# Patient Record
Sex: MALE | Race: WHITE | NOT HISPANIC OR LATINO
[De-identification: names, ages, dates, MRNs, and addresses within clinical notes are randomized per-mention and may not be internally consistent; named-entity substitution may affect disease eponyms.]

---

## 2020-10-29 PROBLEM — Z00.00 ENCOUNTER FOR PREVENTIVE HEALTH EXAMINATION: Status: ACTIVE | Noted: 2020-10-29

## 2020-12-31 ENCOUNTER — APPOINTMENT (OUTPATIENT)
Dept: NEUROLOGY | Facility: CLINIC | Age: 45
End: 2020-12-31
Payer: COMMERCIAL

## 2020-12-31 VITALS
OXYGEN SATURATION: 98 % | DIASTOLIC BLOOD PRESSURE: 73 MMHG | RESPIRATION RATE: 16 BRPM | BODY MASS INDEX: 32.58 KG/M2 | WEIGHT: 215 LBS | HEIGHT: 68 IN | TEMPERATURE: 97.3 F | HEART RATE: 65 BPM | SYSTOLIC BLOOD PRESSURE: 107 MMHG

## 2020-12-31 DIAGNOSIS — G43.009 MIGRAINE W/OUT AURA, NOT INTRACTABLE, W/OUT STATUS MIGRAINOSUS: ICD-10-CM

## 2020-12-31 DIAGNOSIS — Z78.9 OTHER SPECIFIED HEALTH STATUS: ICD-10-CM

## 2020-12-31 DIAGNOSIS — Z87.891 PERSONAL HISTORY OF NICOTINE DEPENDENCE: ICD-10-CM

## 2020-12-31 DIAGNOSIS — R73.9 HYPERGLYCEMIA, UNSPECIFIED: ICD-10-CM

## 2020-12-31 DIAGNOSIS — R41.3 OTHER AMNESIA: ICD-10-CM

## 2020-12-31 DIAGNOSIS — Z82.49 FAMILY HISTORY OF ISCHEMIC HEART DISEASE AND OTHER DISEASES OF THE CIRCULATORY SYSTEM: ICD-10-CM

## 2020-12-31 DIAGNOSIS — E78.5 HYPERLIPIDEMIA, UNSPECIFIED: ICD-10-CM

## 2020-12-31 PROCEDURE — 99203 OFFICE O/P NEW LOW 30 MIN: CPT

## 2020-12-31 PROCEDURE — 99072 ADDL SUPL MATRL&STAF TM PHE: CPT

## 2020-12-31 NOTE — PHYSICAL EXAM
[FreeTextEntry1] : Short term recall 3/3.  He lost his place when performing serial 7's (states was thinking of the words I gave him to remember) but each subtraction was correct.  Remote memory, general fund of knowledge, attention, concentration, and language function were intact.  Pupils are equal, round and reactive to light and accommodation.  Funduscopic exam did not show any papilledema.  Visual fields are intact to confrontation.  Extraocular movements are full.  There is no nystagmus.  The facial muscles are symmetric.  Facial sensation is intact to light touch, temperature, and pinprick.  Hearing is intact to finger rub bilaterally.  Tongue is midline.  Palate elevates symmetrically.  Neck is supple.  There is no meningismus.  Shoulder shrugs are symmetric.  Motor exam demonstrates 5/5 strength in the proximal and distal muscles of the upper and lower extremities.  Tone and bulk were normal.  There is no pronator drift.  Reflexes are 2+ bilaterally in the biceps, triceps, brachioradialis, patellae and ankles.  Toes are downgoing.  There is no clonus.  Coordination demonstrates normal finger-to-nose, heel-to-shin and rapid alternating movements.  Gait demonstrates normal heel and toe walk.  Tandem gait is normal.  Sensory exam, normal light touch, pinprick, vibration sensation in upper and lower extremities. \par \par The patient is well-developed, well-nourished male in no acute distress.  Cardiac exam demonstrates a regular rate and rhythm.  No murmurs.  Carotids are 2+ bilaterally.  No bruits.  Abdomen is soft, nontender, and nondistended.  Bowel sounds are present.  Extremities showed no clubbing, cyanosis or edema. \par \par \par

## 2020-12-31 NOTE — ASSESSMENT
[FreeTextEntry1] : Longstanding history of migrianes with recent more severe headaches in September.  Also c/o more trouble with short term memory although did okay today except for losing his place on serial 7's.\par \par Offered labs including B12/folate but he declined.\par Offerend sumatriptan 50 mg po prn for severe migraine but he didn't think he needed it at this time.\par \par MRI brain w/o contrast.\par \par Return in 6 months.\par \par

## 2020-12-31 NOTE — REVIEW OF SYSTEMS
[Migraine Headache] : migraine headaches [Fever] : no fever [Chills] : no chills [Feeling Poorly] : not feeling poorly [Feeling Tired] : not feeling tired [Recent Weight Gain (___ Lbs)] : no recent weight gain [Recent Weight Loss (___ Lbs)] : no recent weight loss [Suicidal] : not suicidal [Sleep Disturbances] : no sleep disturbances [Anxiety] : no anxiety [Depression] : no depression [Confused or Disoriented] : no confusion [Memory Lapses or Loss] : no memory loss [Decr. Concentrating Ability] : no decrease in concentrating ability [Facial Weakness] : no facial weakness [Arm Weakness] : no arm weakness [Hand Weakness] : no hand weakness [Leg Weakness] : no leg weakness [Poor Coordination] : good coordination [Numbness] : no numbness [Tingling] : no tingling [Seizures] : no convulsions [Dizziness] : no dizziness [Fainting] : no fainting [Lightheadedness] : no lightheadedness [Vertigo] : no vertigo [Cluster Headache] : no cluster headache [Tension Headache] : no tension-type headache [Difficulty Walking] : no difficulty walking [Inability to Walk] : able to walk [Ataxia] : no ataxia [Frequent Falls] : not falling [Eye Pain] : no eye pain [Eyesight Problems] : no eyesight problems [Earache] : no earache [Loss Of Hearing] : no hearing loss [Heart Rate Is Slow] : the heart rate was not slow [Heart Rate Is Fast] : the heart rate was not fast [Chest Pain] : no chest pain [Palpitations] : no palpitations [Shortness Of Breath] : no shortness of breath [Wheezing] : no wheezing [Cough] : no cough [Abdominal Pain] : no abdominal pain [Vomiting] : no vomiting [Constipation] : no constipation [Diarrhea] : no diarrhea [Heartburn] : no heartburn [Melena] : no melena [Dysuria] : no dysuria [Incontinence] : no incontinence [Joint Pain] : no joint pain [Joint Swelling] : no joint swelling [Joint Stiffness] : no joint stiffness [Skin Lesions] : no skin lesions [Skin Wound] : no skin wound [Hot Flashes] : no hot flashes [Muscle Weakness] : no muscle weakness [Easy Bleeding] : no tendency for easy bleeding [Easy Bruising] : no tendency for easy bruising

## 2020-12-31 NOTE — HISTORY OF PRESENT ILLNESS
[FreeTextEntry1] : Pt is 44 yo RH male sent by his wife who is pediatric endocrinologist for [facility]. He reports he is here for evaluation of migrianes. 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  He denies any noticeable physical problems just sometimes headaches.  He and wife noticed a couple of very strong headaches this year.  Has them periodically then gone for years then come back.   First severe headaches in childhood.  Typically would have a pressure, starts light then stable and constant and grows and grows.Gets mild photo/honophobia.  Worse with movement.  Is very used to having them, usually advil one tablet, sometimes two help.  Most of time if catches it early not a problem.  Starts at 2 or 3/10.  Sometimes goes away easily, sometimes pill doesn't help and pain can get up to 9/10 a few times a year.  No nausea or vomiting but can lose appetite.  No accompaniying neurologic symptoms.  Never missed work due to headaches but as child missed school a few times a year due to the headaches.  Sometimes during the night when has strong headache, tries not to miss work or school.  Around September had a couple of strong headaches.  Wife bought him excedrin migriane and that helped.  States lately has been drinking more caffeine 3-4 cups a day.   Usually duration is hours.  Often starts later in the day.  If it goes to the night gets more complicated.  \par \par States lately takes an advil 2-3 times per month but can go up to 6 months w/o any headaches.  Denies any triggers except for alcohol or tiredness.When drinks Vodka or tequilla usually headache next morning  Usually if weather changes or more tired.  States his wife says sometimes he snores.  Denies daytime sleepiness.\par \par States back in childhood in [place], went to [place] to [facility] and had some testing that was negative x-rays he thinks.  Parents didn't suffer from severe headaches, just normal ones.  No siblings.

## 2021-06-24 ENCOUNTER — APPOINTMENT (OUTPATIENT)
Dept: NEUROLOGY | Facility: CLINIC | Age: 46
End: 2021-06-24

## 2022-04-29 DIAGNOSIS — R05.9 COUGH, UNSPECIFIED: ICD-10-CM

## 2022-04-29 DIAGNOSIS — B00.1 HERPESVIRAL VESICULAR DERMATITIS: ICD-10-CM

## 2022-04-29 RX ORDER — ACYCLOVIR 50 MG/G
5 OINTMENT TOPICAL 3 TIMES DAILY
Qty: 1 | Refills: 0 | Status: ACTIVE | COMMUNITY
Start: 2022-04-29 | End: 1900-01-01

## 2022-04-29 RX ORDER — AZITHROMYCIN 250 MG/1
250 TABLET, FILM COATED ORAL
Qty: 1 | Refills: 0 | Status: ACTIVE | COMMUNITY
Start: 2022-04-29 | End: 1900-01-01

## 2023-02-06 RX ORDER — AZITHROMYCIN 250 MG/1
250 TABLET, FILM COATED ORAL
Qty: 1 | Refills: 0 | Status: ACTIVE | COMMUNITY
Start: 2022-12-12 | End: 1900-01-01

## 2023-02-14 RX ORDER — POLYMYXIN B SULFATE AND TRIMETHOPRIM 10000; 1 [USP'U]/ML; MG/ML
10000-0.1 SOLUTION OPHTHALMIC
Qty: 1 | Refills: 0 | Status: ACTIVE | COMMUNITY
Start: 2023-02-14 | End: 1900-01-01

## 2023-02-14 RX ORDER — AMOXICILLIN 500 MG/1
500 CAPSULE ORAL EVERY 8 HOURS
Qty: 30 | Refills: 0 | Status: ACTIVE | COMMUNITY
Start: 2023-02-14 | End: 1900-01-01

## 2024-05-09 ENCOUNTER — APPOINTMENT (OUTPATIENT)
Dept: SURGERY | Facility: CLINIC | Age: 49
End: 2024-05-09
Payer: COMMERCIAL

## 2024-05-09 VITALS — HEIGHT: 68 IN | WEIGHT: 235 LBS | BODY MASS INDEX: 35.61 KG/M2

## 2024-05-09 DIAGNOSIS — M62.08 SEPARATION OF MUSCLE (NONTRAUMATIC), OTHER SITE: ICD-10-CM

## 2024-05-09 DIAGNOSIS — E66.09 OTHER OBESITY DUE TO EXCESS CALORIES: ICD-10-CM

## 2024-05-09 DIAGNOSIS — K42.9 UMBILICAL HERNIA W/OUT OBSTRUCTION OR GANGRENE: ICD-10-CM

## 2024-05-09 PROCEDURE — 99203 OFFICE O/P NEW LOW 30 MIN: CPT

## 2024-05-09 NOTE — ASSESSMENT
[FreeTextEntry1] : Dagoberto is a pleasant 48-year-old  presenting to the office with his wife (Dr. Chery, who is a pediatric endocrinologist at [facility]) with no significant past medical history with concerns about supraumbilical swelling noticed by his wife recently.  He enjoys exercising and lifting weights at the gym.  Physical examination demonstrates a tender grape sized bulge at the umbilicus which is reducible with a moderate degree of difficulty consistent with a symptomatic protruding umbilical hernia warranting surgical repair.  There is no evidence of incarceration or strangulation, and the patient denies any symptoms of obstruction.  This hernia is complicated by a moderate rectus diastasis likely related to his excess abdominal weight and moderately protuberant abdomen.  His current BMI is 36.  With regard to his umbilical hernia, I explained the pros and cons of surgery, as well as all risks, benefits, indications and alternatives of the procedure and the patient understood and agreed.  He will talk this over with work and his wife and call back to schedule in the near future.  Dagoberto is aware that the repair of his umbilical hernia with mesh will be done under LOCAL with IV SEDATION at the [facility] with presurgical testing waived.  He was encouraged to avoid heavy lifting and strenuous activity in the interim, of course.    We also discussed the importance of calorie restriction, healthy eating, and moderate aerobic exercise with regard to weight loss, hernia recurrence, his diastasis recti, and his overall health.  I did recommend he purchase and wear an abdominal binder while exercising at the gym to reduce the risk of further worsening of his rectus diastasis (which typically will improve or resolve spontaneously with abdominal weight loss in men.

## 2024-05-09 NOTE — PHYSICAL EXAM
[Normal Breath Sounds] : Normal breath sounds [No Rash or Lesion] : No rash or lesion [Alert] : alert [Calm] : calm [JVD] : no jugular venous distention  [de-identified] : Overweight [de-identified] : Normal [de-identified] : Moderately protuberant abdomen, mild to moderate rectus diastasis [de-identified] : Umbilical hernia, reducible

## 2024-06-06 ENCOUNTER — APPOINTMENT (OUTPATIENT)
Dept: VASCULAR SURGERY | Facility: CLINIC | Age: 49
End: 2024-06-06
Payer: COMMERCIAL

## 2024-06-06 VITALS — SYSTOLIC BLOOD PRESSURE: 129 MMHG | HEART RATE: 66 BPM | DIASTOLIC BLOOD PRESSURE: 82 MMHG

## 2024-06-06 VITALS — WEIGHT: 235 LBS | HEIGHT: 68 IN | BODY MASS INDEX: 35.61 KG/M2

## 2024-06-06 DIAGNOSIS — I83.893 VARICOSE VEINS OF BILATERAL LOWER EXTREMITIES WITH OTHER COMPLICATIONS: ICD-10-CM

## 2024-06-06 PROCEDURE — 99203 OFFICE O/P NEW LOW 30 MIN: CPT

## 2024-06-06 PROCEDURE — 93970 EXTREMITY STUDY: CPT

## 2024-06-06 NOTE — ASSESSMENT
[FreeTextEntry1] : This is a 48-year-old man with symptomatic varicose veins. He is not a candidate for EVLT as he has no GSV reflux. He would require bilateral stab phlebectomy for treatment of the veins. This would reduce the possibility of worsening swelling and possible progression to ulceration. I will plan for phlebectomy in the office once insurance approval is obtained.

## 2024-06-06 NOTE — PHYSICAL EXAM
[Abdominal Aorta] : Normal abdominal aorta [Femoral Arteries] : Normal femoral pulses [2+] : left 2+ [Ankle Swelling (On Exam)] : present [Varicose Veins Of Lower Extremities] : bilaterally [Ankle Swelling Bilaterally] : severe [] : bilaterally [Ankle Swelling On The Right] : mild [Abdomen Masses] : No abdominal masses [Abdomen Tenderness] : ~T ~M No abdominal tenderness [Abdominal Bruit] : no abdominal bruit

## 2024-06-06 NOTE — HISTORY OF PRESENT ILLNESS
[FreeTextEntry1] : Mr. Chery is a 48-year-old with bilateral varicose veins and leg swelling. He has swelling at the end of the day and has been noticing worsening varicosities on both legs. He denies history of ulceration or bleeding from the veins.

## 2024-06-06 NOTE — DATA REVIEWED
[FreeTextEntry1] : venous duplex demonstrates no evidence of DVT. He has no GSV reflux bilaterally. He has bilateral large tributaries.

## 2024-07-26 DIAGNOSIS — K08.89 OTHER SPECIFIED DISORDERS OF TEETH AND SUPPORTING STRUCTURES: ICD-10-CM

## 2024-07-26 RX ORDER — CHLORHEXIDINE GLUCONATE, 0.12% ORAL RINSE 1.2 MG/ML
0.12 SOLUTION DENTAL
Qty: 1 | Refills: 0 | Status: ACTIVE | COMMUNITY
Start: 2024-07-26 | End: 1900-01-01